# Patient Record
Sex: MALE | Race: WHITE | Employment: FULL TIME | URBAN - METROPOLITAN AREA
[De-identification: names, ages, dates, MRNs, and addresses within clinical notes are randomized per-mention and may not be internally consistent; named-entity substitution may affect disease eponyms.]

---

## 2019-03-20 ENCOUNTER — APPOINTMENT (OUTPATIENT)
Dept: GENERAL RADIOLOGY | Age: 19
End: 2019-03-20
Attending: PHYSICIAN ASSISTANT
Payer: COMMERCIAL

## 2019-03-20 ENCOUNTER — HOSPITAL ENCOUNTER (EMERGENCY)
Age: 19
Discharge: HOME | End: 2019-03-20
Attending: EMERGENCY MEDICINE
Payer: COMMERCIAL

## 2019-03-20 VITALS
HEART RATE: 82 BPM | OXYGEN SATURATION: 99 % | TEMPERATURE: 98.2 F | HEIGHT: 75 IN | SYSTOLIC BLOOD PRESSURE: 157 MMHG | BODY MASS INDEX: 23 KG/M2 | DIASTOLIC BLOOD PRESSURE: 90 MMHG | RESPIRATION RATE: 16 BRPM | WEIGHT: 185 LBS

## 2019-03-20 DIAGNOSIS — Z78.9 UP TO DATE WITH TETANUS TOXOID IMMUNIZATION: ICD-10-CM

## 2019-03-20 DIAGNOSIS — S61.012A LACERATION OF LEFT THUMB WITHOUT FOREIGN BODY WITHOUT DAMAGE TO NAIL, INITIAL ENCOUNTER: Primary | ICD-10-CM

## 2019-03-20 DIAGNOSIS — S60.419A ABRASION OF FINGER, INITIAL ENCOUNTER: ICD-10-CM

## 2019-03-20 PROCEDURE — 73140 X-RAY EXAM OF FINGER(S): CPT

## 2019-03-20 PROCEDURE — 77030031132 HC SUT NYL COVD -A

## 2019-03-20 PROCEDURE — 75810000293 HC SIMP/SUPERF WND  RPR

## 2019-03-20 PROCEDURE — 99282 EMERGENCY DEPT VISIT SF MDM: CPT

## 2019-03-20 PROCEDURE — 77030018836 HC SOL IRR NACL ICUM -A

## 2019-03-20 RX ORDER — CEPHALEXIN 500 MG/1
500 CAPSULE ORAL 4 TIMES DAILY
Qty: 28 CAP | Refills: 0 | Status: SHIPPED | OUTPATIENT
Start: 2019-03-20 | End: 2019-03-27

## 2019-03-20 NOTE — ED PROVIDER NOTES
EMERGENCY DEPARTMENT HISTORY AND PHYSICAL EXAM    Date: 3/20/2019  Patient Name: Reji Domínguez    History of Presenting Illness     Chief Complaint   Patient presents with    Laceration         History Provided By: Patient    Chief Complaint: Laceration  Duration: Hours  Timing:  Acute  Location: Left thumb, left third digit  Quality: Aching  Severity: 7 out of 10  Modifying Factors: None. Associated Symptoms: denies any other associated signs or symptoms    Additional History (Context):   6:48 PM   Reji Domínguez is a 19 y.o. male with PMHX of prior hand injury with tetanus updated within the last 5 years who presents to the emergency department C/O left thumb laceration sustained just prior to arrival.  Patient states he was throwing away a plastic ladle when he attempted to break it in half to throat away and cut himself. Tetanus is up-to-date. Pt works as a . Pt denies numbness, tingling, and any other sxs or complaints. PCP: None        Past History     Past Medical History:  History reviewed. No pertinent past medical history. Past Surgical History:  History reviewed. No pertinent surgical history. Family History:  History reviewed. No pertinent family history. Social History:  Social History     Tobacco Use    Smoking status: Current Every Day Smoker     Packs/day: 0.50    Smokeless tobacco: Never Used   Substance Use Topics    Alcohol use: No    Drug use: Never       Allergies: Allergies   Allergen Reactions    Guaifenesin Palpitations         Review of Systems   Review of Systems   Skin: Positive for wound. Neurological: Negative for weakness and numbness. All other systems reviewed and are negative.       Physical Exam     Vitals:    03/20/19 1832 03/20/19 1834   BP:  157/90   Pulse: 82    Resp:  16   Temp: 98.2 °F (36.8 °C)    SpO2:  99%   Weight:  83.9 kg (185 lb)   Height:  6' 2.75\" (1.899 m)     Physical Exam   Constitutional: He appears well-developed and well-nourished. No distress. HENT:   Head: Normocephalic and atraumatic. Right Ear: External ear normal.   Left Ear: External ear normal.   Nose: Nose normal.   Eyes: Conjunctivae are normal.   Neck: Normal range of motion. Cardiovascular: Normal rate, regular rhythm and normal heart sounds. Pulmonary/Chest: Effort normal and breath sounds normal. No respiratory distress. Neurological: He is alert. Skin: Skin is warm and dry. He is not diaphoretic. 2 cm gaping laceration to the volar aspect of the distal phalanx. Mild bleeding. Neurovascularly intact distally. Brisk cap refill. Normal range of motion of the thumb. Very small abrasion noted to the dorsal aspect of the PIP joint, third digit. Dirt noted to patient's hands and under his nails bilaterally   Psychiatric: He has a normal mood and affect. Nursing note and vitals reviewed. Diagnostic Study Results     Labs -   No results found for this or any previous visit (from the past 12 hour(s)). Radiologic Studies -   No obvious retained foreign body. XR THUMB LT MIN 2 V    (Results Pending)     CT Results  (Last 48 hours)    None        CXR Results  (Last 48 hours)    None          Medications given in the ED-  Medications - No data to display      Medical Decision Making   I am the first provider for this patient. I reviewed the vital signs, available nursing notes, past medical history, past surgical history, family history and social history. Vital Signs-Reviewed the patient's vital signs.     Records Reviewed: Nursing Notes and Old Medical Records     Wound Repair  Date/Time: 3/20/2019 9:26 PM  Performed by: PAPreparation: skin prepped with Betadine  Location details: left thumb  Wound length:2.5 cm or less    Anesthesia:  Local Anesthetic: lidocaine 1% without epinephrine  Foreign bodies: no foreign bodies  Irrigation solution: saline  Irrigation method: syringe  Debridement: none  Skin closure: 5-0 nylon  Number of sutures: 4  Technique: simple  Patient tolerance: Patient tolerated the procedure well with no immediate complications  My total time at bedside, performing this procedure was 1-15 minutes. Provider Notes (Medical Decision Making): Appears well hydrated and non toxic, with stable vital signs, presenting with left thumb laceration sustained just prior to arrival.  No obvious foreign body on x-ray and neurovascularly intact. Repaired here with 4 sutures without complication. Patient is  and given possible exposures will discharge on Keflex. Based on today's assessment, I feel the patient is stable for discharge to home with outpatient follow up. Return precautions and referrals provided. Diagnosis and Disposition       DISCHARGE NOTE:    Amanda Pollackauvais José Miguel's  results have been reviewed with him. He has been counseled regarding his diagnosis. He verbally conveys understanding and agreement of the signs, symptoms, diagnosis, treatment and prognosis and additionally agrees to follow up as recommended with Dr. None in 24 - 48 hours. He also agrees with the care-plan and conveys that all of his questions have been answered. I have also put together some discharge instructions for him that include: 1) educational information regarding their diagnosis, 2) how to care for their diagnosis at home, as well a 3) list of reasons why they would want to return to the ED prior to their follow-up appointment, should their condition change. CLINICAL IMPRESSION:    1. Laceration of left thumb without foreign body without damage to nail, initial encounter    2. Abrasion of finger, initial encounter    3. Up to date with tetanus toxoid immunization        PLAN:  1. D/C Home  2. Discharge Medication List as of 3/20/2019  8:18 PM      START taking these medications    Details   cephALEXin (KEFLEX) 500 mg capsule Take 1 Cap by mouth four (4) times daily for 7 days. , Print, Disp-28 Cap, R-0           3. Follow-up Information     Follow up With Specialties Details Why Ruddy Corona   [address]    Brannon Louie MD Orthopedic Surgery, Orthopedic Surgery, Orthopedic Surgery Schedule an appointment as soon as possible for a visit For wound re-check John [address] [place] [id number]  [phone]      [facility] EMERGENCY DEPT Emergency Medicine In 1 week For suture removal [address] EMERGENCY DEPT Emergency Medicine  As needed, If symptoms worsen [address]  [phone]

## 2019-03-21 NOTE — DISCHARGE INSTRUCTIONS
You have 4 sutures that need to be removed in 7-10 days. Antibiotics as prescribed. Return for redness, drainage, pain, or any other concerns. Cuts on the Hand Closed With Stitches: Care Instructions  Your Care Instructions    A cut on your hand can be on your fingers, your thumb, or the front or back of your hand. Sometimes a cut can injure the tendons, blood vessels, or nerves of your hand. The doctor used stitches to close the cut. Using stitches also helps the cut heal and reduces scarring. The doctor may have given you a splint to help prevent you from moving your hand, fingers, or thumb. If the cut went deep and through the skin, the doctor put in two layers of stitches. The deeper layer brings the deep part of the cut together. These stitches will dissolve and don't need to be removed. The stitches in the upper layer are the ones you see on the cut. You will probably have a bandage. You will need to have the stitches removed, usually in 7 to 14 days. The doctor may suggest that you see a hand specialist if the cut is very deep or if you have trouble moving your fingers or have less feeling in your hand. The doctor has checked you carefully, but problems can develop later. If you notice any problems or new symptoms, get medical treatment right away. Follow-up care is a key part of your treatment and safety. Be sure to make and go to all appointments, and call your doctor if you are having problems. It's also a good idea to know your test results and keep a list of the medicines you take. How can you care for yourself at home? · Keep the cut dry for the first 24 to 48 hours. After this, you can shower if your doctor okays it. Pat the cut dry. · Don't soak the cut, such as in a bathtub. Your doctor will tell you when it's safe to get the cut wet. · If your doctor told you how to care for your cut, follow your doctor's instructions.  If you did not get instructions, follow this general advice:  ? After the first 24 to 48 hours, wash around the cut with clean water 2 times a day. Don't use hydrogen peroxide or alcohol, which can slow healing. ? You may cover the cut with a thin layer of petroleum jelly, such as Vaseline, and a nonstick bandage. ? Apply more petroleum jelly and replace the bandage as needed. · Prop up the sore hand on a pillow anytime you sit or lie down during the next 3 days. Try to keep it above the level of your heart. This will help reduce swelling. · Avoid any activity that could cause your cut to reopen. · Do not remove the stitches on your own. Your doctor will tell you when to come back to have the stitches removed. · Be safe with medicines. Take pain medicines exactly as directed. ? If the doctor gave you a prescription medicine for pain, take it as prescribed. ? If you are not taking a prescription pain medicine, ask your doctor if you can take an over-the-counter medicine. When should you call for help? Call your doctor now or seek immediate medical care if:    · You have new pain, or your pain gets worse.     · The skin near the cut is cold or pale or changes color.     · You have tingling, weakness, or numbness near the cut.     · The cut starts to bleed, and blood soaks through the bandage. Oozing small amounts of blood is normal.     · You have trouble moving the area of the hand near the cut.     · You have symptoms of infection, such as:  ? Increased pain, swelling, warmth, or redness around the cut.  ? Red streaks leading from the cut.  ? Pus draining from the cut.  ? A fever.    Watch closely for changes in your health, and be sure to contact your doctor if:    · You do not get better as expected. Where can you learn more? Go to http://josé-sasha.info/. Enter T250 in the search box to learn more about \"Cuts on the Hand Closed With Stitches: Care Instructions. \"  Current as of: September 23, 2018  Content Version: 11.9  © 0028-8536 [employer]. Care instructions adapted under license by [employer] (which disclaims liability or warranty for this information). If you have questions about a medical condition or this instruction, always ask your healthcare professional. [place] [address] any warranty or liability for your use of this information.

## 2022-02-21 PROBLEM — M25.511 ACUTE PAIN OF RIGHT SHOULDER: Status: ACTIVE | Noted: 2021-05-05
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
